# Patient Record
Sex: MALE | URBAN - METROPOLITAN AREA
[De-identification: names, ages, dates, MRNs, and addresses within clinical notes are randomized per-mention and may not be internally consistent; named-entity substitution may affect disease eponyms.]

---

## 2019-12-02 ENCOUNTER — OFFICE VISIT (OUTPATIENT)
Dept: URBAN - METROPOLITAN AREA CLINIC 40 | Facility: CLINIC | Age: 84
End: 2019-12-02
Payer: MEDICARE

## 2019-12-02 PROCEDURE — 99204 OFFICE O/P NEW MOD 45 MIN: CPT | Performed by: OPTOMETRIST

## 2019-12-02 ASSESSMENT — INTRAOCULAR PRESSURE
OD: 18
OS: 17

## 2019-12-02 ASSESSMENT — KERATOMETRY
OS: 46.25
OD: 46.38

## 2019-12-02 NOTE — IMPRESSION/PLAN
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 Plan: Discussed diagnosis in detail with patient. Will continue to observe condition and or symptoms. Refer to retina STAT.

## 2019-12-03 ENCOUNTER — OFFICE VISIT (OUTPATIENT)
Dept: URBAN - METROPOLITAN AREA CLINIC 17 | Facility: CLINIC | Age: 84
End: 2019-12-03
Payer: MEDICARE

## 2019-12-03 ENCOUNTER — SURGERY (OUTPATIENT)
Dept: URBAN - METROPOLITAN AREA SURGERY 7 | Facility: SURGERY | Age: 84
End: 2019-12-03
Payer: MEDICARE

## 2019-12-03 DIAGNOSIS — H25.13 AGE-RELATED NUCLEAR CATARACT, BILATERAL: ICD-10-CM

## 2019-12-03 PROCEDURE — 92134 CPTRZ OPH DX IMG PST SGM RTA: CPT | Performed by: OPHTHALMOLOGY

## 2019-12-03 PROCEDURE — 67108 REPAIR DETACHED RETINA: CPT | Performed by: OPHTHALMOLOGY

## 2019-12-03 PROCEDURE — 92004 COMPRE OPH EXAM NEW PT 1/>: CPT | Performed by: OPHTHALMOLOGY

## 2019-12-03 RX ORDER — OFLOXACIN 3 MG/ML
0.3 % SOLUTION/ DROPS OPHTHALMIC
Qty: 5 | Refills: 3 | Status: INACTIVE
Start: 2019-12-03 | End: 2019-12-11

## 2019-12-03 RX ORDER — PREDNISOLONE ACETATE 10 MG/ML
1 % SUSPENSION/ DROPS OPHTHALMIC
Qty: 10 | Refills: 5 | Status: INACTIVE
Start: 2019-12-03 | End: 2020-01-02

## 2019-12-03 ASSESSMENT — INTRAOCULAR PRESSURE
OS: 15
OS: 15
OD: 17
OD: 17

## 2019-12-03 NOTE — IMPRESSION/PLAN
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 OS. Condition: macula off. Vision: vision affected. Plan: Discussed diagnosis in detail with patient. Discussed risks of progression. Emergent Surgical treatment is recommended to repair the retina PPVx LEFT EYE. Surgical risks and benefits were discussed, explained and understood by patient. Unable to tell how much vision will be recovered. Discussed gas bubble and post-op care: no traveling, flying or high altitude for approximately 6 - 8 weeks. All questions answered. Patient elects to proceed with recommendation. RL1. Educational material provided to patient.  OCT shows Mac Off OS

## 2019-12-04 ENCOUNTER — POST-OPERATIVE VISIT (OUTPATIENT)
Dept: URBAN - METROPOLITAN AREA CLINIC 40 | Facility: CLINIC | Age: 84
End: 2019-12-04

## 2019-12-04 ASSESSMENT — INTRAOCULAR PRESSURE
OD: 22
OS: 32

## 2019-12-11 ENCOUNTER — POST-OPERATIVE VISIT (OUTPATIENT)
Dept: URBAN - METROPOLITAN AREA CLINIC 40 | Facility: CLINIC | Age: 84
End: 2019-12-11

## 2019-12-11 ASSESSMENT — INTRAOCULAR PRESSURE
OS: 16
OD: 16

## 2020-01-02 ENCOUNTER — POST-OPERATIVE VISIT (OUTPATIENT)
Dept: URBAN - METROPOLITAN AREA CLINIC 40 | Facility: CLINIC | Age: 85
End: 2020-01-02

## 2020-01-02 ASSESSMENT — INTRAOCULAR PRESSURE
OD: 17
OS: 18

## 2020-02-18 ENCOUNTER — OFFICE VISIT (OUTPATIENT)
Dept: URBAN - METROPOLITAN AREA CLINIC 40 | Facility: CLINIC | Age: 85
End: 2020-02-18
Payer: MEDICARE

## 2020-02-18 DIAGNOSIS — H33.012 RETINAL DETACHMENT WITH SINGLE BREAK, LEFT EYE: ICD-10-CM

## 2020-02-18 DIAGNOSIS — H25.813 COMBINED FORMS OF AGE-RELATED CATARACT, BILATERAL: Primary | ICD-10-CM

## 2020-02-18 PROCEDURE — 99214 OFFICE O/P EST MOD 30 MIN: CPT | Performed by: OPHTHALMOLOGY

## 2020-02-18 ASSESSMENT — KERATOMETRY
OD: 46.13
OS: 46.25

## 2020-02-18 ASSESSMENT — VISUAL ACUITY: OD: 20/40

## 2020-02-18 ASSESSMENT — INTRAOCULAR PRESSURE
OS: 22
OD: 22

## 2020-02-18 NOTE — IMPRESSION/PLAN
Impression: Combined forms of age-related cataract, bilateral: H25.813. .  Visually significant, quality of life issue, could improve with surgery. Plan: Cataracts account for the patient's complaints. Discussed all risks, benefits, alternatives, procedures and recovery. Patient understands changing glasses will not improve vision. Patient desires to have surgery, recommend phacoemulsification with intraocular lens implant OS. lvl 2 - pt not for premium IOL -  Re-evaluate OD for possible cataract surgery after OS is done.

## 2020-02-18 NOTE — IMPRESSION/PLAN
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 Plan: Dr PATEL to see prior to cat sx.

## 2020-03-06 ENCOUNTER — OFFICE VISIT (OUTPATIENT)
Dept: URBAN - METROPOLITAN AREA CLINIC 23 | Facility: CLINIC | Age: 85
End: 2020-03-06
Payer: MEDICARE

## 2020-03-06 PROCEDURE — 92134 CPTRZ OPH DX IMG PST SGM RTA: CPT | Performed by: OPHTHALMOLOGY

## 2020-03-06 PROCEDURE — 99213 OFFICE O/P EST LOW 20 MIN: CPT | Performed by: OPHTHALMOLOGY

## 2020-03-06 ASSESSMENT — INTRAOCULAR PRESSURE
OS: 15
OD: 16

## 2020-03-06 NOTE — IMPRESSION/PLAN
Impression: s/p PPVX for Repair of Retinal detachment with single break, left eye with Gas 12/03/2019: H33.012. OS. Condition: resolved with surgery. Vision: vision improved. Plan: Discussed diagnosis in detail with patient. Exam OS shows the retina is attached and stable post retina surgery. OK to proceed with Cataract sx OS for vision improvement, recommend to use steroid medication for 6 - 8 wks QID with no tapering.

## 2020-03-09 ENCOUNTER — TESTING ONLY (OUTPATIENT)
Dept: URBAN - METROPOLITAN AREA CLINIC 40 | Facility: CLINIC | Age: 85
End: 2020-03-09
Payer: MEDICARE

## 2020-03-09 PROCEDURE — 76519 ECHO EXAM OF EYE: CPT | Performed by: OPHTHALMOLOGY

## 2020-03-09 ASSESSMENT — PACHYMETRY
OD: 3.19
OS: 3.10
OS: 22.77
OD: 22.57

## 2020-03-11 RX ORDER — PREDNISOLONE ACETATE 10 MG/ML
1 % SUSPENSION/ DROPS OPHTHALMIC
Qty: 10 | Refills: 1 | Status: INACTIVE
Start: 2020-03-11 | End: 2020-04-13

## 2020-03-11 RX ORDER — OFLOXACIN 3 MG/ML
0.3 % SOLUTION/ DROPS OPHTHALMIC
Qty: 5 | Refills: 1 | Status: INACTIVE
Start: 2020-03-11 | End: 2020-03-23

## 2020-03-11 RX ORDER — OFLOXACIN 3 MG/ML
0.3 % SOLUTION/ DROPS OPHTHALMIC
Qty: 5 | Refills: 1 | Status: INACTIVE
Start: 2020-03-29 | End: 2020-04-06

## 2020-03-11 RX ORDER — PREDNISOLONE ACETATE 10 MG/ML
1 % SUSPENSION/ DROPS OPHTHALMIC
Qty: 10 | Refills: 1 | Status: INACTIVE
Start: 2020-03-31 | End: 2020-04-27

## 2020-03-11 RX ORDER — KETOROLAC TROMETHAMINE 5 MG/ML
0.5 % SOLUTION OPHTHALMIC
Qty: 5 | Refills: 1 | Status: INACTIVE
Start: 2020-03-31 | End: 2020-04-27

## 2020-03-11 RX ORDER — KETOROLAC TROMETHAMINE 5 MG/ML
0.5 % SOLUTION OPHTHALMIC
Qty: 10 | Refills: 1 | Status: INACTIVE
Start: 2020-03-11 | End: 2020-04-13

## 2020-03-16 ENCOUNTER — SURGERY (OUTPATIENT)
Dept: URBAN - METROPOLITAN AREA SURGERY 11 | Facility: SURGERY | Age: 85
End: 2020-03-16
Payer: MEDICARE

## 2020-03-16 PROCEDURE — 66984 XCAPSL CTRC RMVL W/O ECP: CPT | Performed by: OPHTHALMOLOGY

## 2020-03-17 ENCOUNTER — POST-OPERATIVE VISIT (OUTPATIENT)
Dept: URBAN - METROPOLITAN AREA CLINIC 23 | Facility: CLINIC | Age: 85
End: 2020-03-17

## 2020-03-17 PROCEDURE — 99024 POSTOP FOLLOW-UP VISIT: CPT | Performed by: OPTOMETRIST

## 2020-03-17 ASSESSMENT — INTRAOCULAR PRESSURE
OD: 20
OS: 17

## 2020-03-24 ENCOUNTER — POST-OPERATIVE VISIT (OUTPATIENT)
Dept: URBAN - METROPOLITAN AREA CLINIC 23 | Facility: CLINIC | Age: 85
End: 2020-03-24

## 2020-03-24 PROCEDURE — 99024 POSTOP FOLLOW-UP VISIT: CPT | Performed by: OPTOMETRIST

## 2020-03-24 ASSESSMENT — VISUAL ACUITY
OD: 20/60 -2
OS: 20/60 -1

## 2020-03-24 ASSESSMENT — INTRAOCULAR PRESSURE
OD: 22
OS: 22

## 2020-05-11 ENCOUNTER — SURGERY (OUTPATIENT)
Dept: URBAN - METROPOLITAN AREA SURGERY 11 | Facility: SURGERY | Age: 85
End: 2020-05-11
Payer: MEDICARE

## 2020-05-11 PROCEDURE — 66984 XCAPSL CTRC RMVL W/O ECP: CPT | Performed by: OPHTHALMOLOGY

## 2020-05-12 ENCOUNTER — POST-OPERATIVE VISIT (OUTPATIENT)
Dept: URBAN - METROPOLITAN AREA CLINIC 40 | Facility: CLINIC | Age: 85
End: 2020-05-12

## 2020-05-12 PROCEDURE — 99024 POSTOP FOLLOW-UP VISIT: CPT | Performed by: OPHTHALMOLOGY

## 2020-05-12 ASSESSMENT — INTRAOCULAR PRESSURE
OD: 15
OS: 14

## 2020-05-20 ENCOUNTER — POST-OPERATIVE VISIT (OUTPATIENT)
Dept: URBAN - METROPOLITAN AREA CLINIC 40 | Facility: CLINIC | Age: 85
End: 2020-05-20

## 2020-05-20 DIAGNOSIS — Z09 ENCNTR FOR F/U EXAM AFT TRTMT FOR COND OTH THAN MALIG NEOPLM: Primary | ICD-10-CM

## 2020-05-20 PROCEDURE — 99024 POSTOP FOLLOW-UP VISIT: CPT | Performed by: OPTOMETRIST

## 2020-05-20 ASSESSMENT — INTRAOCULAR PRESSURE
OD: 17
OS: 16

## 2020-06-10 ENCOUNTER — OFFICE VISIT (OUTPATIENT)
Dept: URBAN - METROPOLITAN AREA CLINIC 40 | Facility: CLINIC | Age: 85
End: 2020-06-10
Payer: COMMERCIAL

## 2020-06-10 DIAGNOSIS — H52.4 PRESBYOPIA: Primary | ICD-10-CM

## 2020-06-10 PROCEDURE — 92014 COMPRE OPH EXAM EST PT 1/>: CPT | Performed by: OPTOMETRIST

## 2020-06-10 ASSESSMENT — VISUAL ACUITY
OD: 20/25
OS: 20/25

## 2020-06-10 ASSESSMENT — KERATOMETRY
OS: 46.75
OD: 46.00

## 2021-09-20 ENCOUNTER — OFFICE VISIT (OUTPATIENT)
Dept: URBAN - METROPOLITAN AREA CLINIC 40 | Facility: CLINIC | Age: 86
End: 2021-09-20
Payer: MEDICARE

## 2021-09-20 DIAGNOSIS — H50.10 EXOTROPIA: Primary | ICD-10-CM

## 2021-09-20 DIAGNOSIS — E11.9 TYPE 2 DIABETES MELLITUS W/O COMPLICATION: ICD-10-CM

## 2021-09-20 PROCEDURE — 99214 OFFICE O/P EST MOD 30 MIN: CPT | Performed by: OPTOMETRIST

## 2021-09-20 ASSESSMENT — KERATOMETRY
OS: 45.63
OD: 46.50

## 2021-09-20 ASSESSMENT — INTRAOCULAR PRESSURE
OD: 13
OS: 16

## 2021-09-20 NOTE — IMPRESSION/PLAN
Impression: Type 2 diabetes mellitus w/o complication: D32.2. Plan: Diabetes type II: no background retinopathy, no signs of neovascularization noted. Discussed ocular and systemic benefits of blood sugar control.

## 2021-09-20 NOTE — IMPRESSION/PLAN
Impression: Exotropia: H50.10. Plan: Discussed diagnosis in detail with patient. Will continue to observe condition and or symptoms. Patch to avoid diplopia. If not better see pediatric ophthalmology for strabismus consultation.

## 2022-11-01 ENCOUNTER — OFFICE VISIT (OUTPATIENT)
Dept: URBAN - METROPOLITAN AREA CLINIC 40 | Facility: CLINIC | Age: 87
End: 2022-11-01
Payer: MEDICARE

## 2022-11-01 DIAGNOSIS — R73.03 PREDIABETES: Primary | ICD-10-CM

## 2022-11-01 DIAGNOSIS — H50.10 EXOTROPIA: ICD-10-CM

## 2022-11-01 PROCEDURE — 99214 OFFICE O/P EST MOD 30 MIN: CPT | Performed by: OPTOMETRIST

## 2022-11-01 ASSESSMENT — KERATOMETRY
OS: 46.63
OD: 46.50

## 2022-11-01 ASSESSMENT — INTRAOCULAR PRESSURE
OS: 15
OD: 14

## 2022-11-01 NOTE — IMPRESSION/PLAN
Impression: Exotropia: H50.10. Plan: OU: Discussed diagnosis in detail with patient. Discussed treatment options with patient. Discussed risks and benefits and patient understands. Will continue to observe condition and or symptoms. Call if [address] worsens.

## 2024-09-10 ENCOUNTER — OFFICE VISIT (OUTPATIENT)
Dept: URBAN - METROPOLITAN AREA CLINIC 40 | Facility: CLINIC | Age: 89
End: 2024-09-10
Payer: MEDICARE

## 2024-09-10 DIAGNOSIS — E11.9 TYPE 2 DIABETES MELLITUS W/O COMPLICATION: ICD-10-CM

## 2024-09-10 DIAGNOSIS — H35.40 PERIPHERAL RETINAL DEGENERATION: ICD-10-CM

## 2024-09-10 DIAGNOSIS — H50.10 EXOTROPIA: ICD-10-CM

## 2024-09-10 DIAGNOSIS — H04.123 DRY EYE SYNDROME OF BILATERAL LACRIMAL GLANDS: ICD-10-CM

## 2024-09-10 DIAGNOSIS — H49.01 3RD NERVE PALSY OF RIGHT EYE: Primary | ICD-10-CM

## 2024-09-10 PROCEDURE — 99214 OFFICE O/P EST MOD 30 MIN: CPT | Performed by: OPTOMETRIST

## 2024-09-10 ASSESSMENT — KERATOMETRY
OS: 45.88
OD: 46.88

## 2024-09-10 ASSESSMENT — INTRAOCULAR PRESSURE
OD: 15
OS: 16

## 2025-07-31 ENCOUNTER — OFFICE VISIT (OUTPATIENT)
Dept: URBAN - METROPOLITAN AREA CLINIC 40 | Facility: CLINIC | Age: OVER 89
End: 2025-07-31
Payer: MEDICARE

## 2025-07-31 DIAGNOSIS — H04.123 DRY EYE SYNDROME OF BILATERAL LACRIMAL GLANDS: ICD-10-CM

## 2025-07-31 DIAGNOSIS — E11.9 TYPE 2 DIABETES MELLITUS WITHOUT COMPLICATIONS: Primary | ICD-10-CM

## 2025-07-31 PROCEDURE — 99214 OFFICE O/P EST MOD 30 MIN: CPT | Performed by: OPTOMETRIST

## 2025-07-31 ASSESSMENT — INTRAOCULAR PRESSURE
OS: 16
OD: 17